# Patient Record
Sex: FEMALE
[De-identification: names, ages, dates, MRNs, and addresses within clinical notes are randomized per-mention and may not be internally consistent; named-entity substitution may affect disease eponyms.]

---

## 2019-02-15 ENCOUNTER — RECORD ABSTRACTING (OUTPATIENT)
Age: 24
End: 2019-02-15

## 2019-02-15 DIAGNOSIS — Z78.9 OTHER SPECIFIED HEALTH STATUS: ICD-10-CM

## 2019-02-15 PROBLEM — Z00.00 ENCOUNTER FOR PREVENTIVE HEALTH EXAMINATION: Status: ACTIVE | Noted: 2019-02-15

## 2019-03-04 ENCOUNTER — APPOINTMENT (OUTPATIENT)
Dept: INTERNAL MEDICINE | Facility: CLINIC | Age: 24
End: 2019-03-04
Payer: COMMERCIAL

## 2019-03-04 VITALS
TEMPERATURE: 99.1 F | SYSTOLIC BLOOD PRESSURE: 90 MMHG | WEIGHT: 145 LBS | OXYGEN SATURATION: 98 % | HEIGHT: 68 IN | HEART RATE: 65 BPM | RESPIRATION RATE: 14 BRPM | DIASTOLIC BLOOD PRESSURE: 76 MMHG | BODY MASS INDEX: 21.98 KG/M2

## 2019-03-04 DIAGNOSIS — K64.9 UNSPECIFIED HEMORRHOIDS: ICD-10-CM

## 2019-03-04 DIAGNOSIS — Z83.2 FAMILY HISTORY OF DISEASES OF THE BLOOD AND BLOOD-FORMING ORGANS AND CERTAIN DISORDERS INVOLVING THE IMMUNE MECHANISM: ICD-10-CM

## 2019-03-04 DIAGNOSIS — Z83.49 FAMILY HISTORY OF OTHER ENDOCRINE, NUTRITIONAL AND METABOLIC DISEASES: ICD-10-CM

## 2019-03-04 PROCEDURE — 99214 OFFICE O/P EST MOD 30 MIN: CPT

## 2019-03-04 RX ORDER — ESOMEPRAZOLE MAGNESIUM 40 MG/1
40 CAPSULE, DELAYED RELEASE ORAL DAILY
Qty: 30 | Refills: 0 | Status: DISCONTINUED | COMMUNITY
Start: 2019-03-04 | End: 2019-03-04

## 2019-03-04 NOTE — PAST MEDICAL HISTORY
[Menstruating] : menstruating [Definite ___ (Date)] : the last menstrual period was [unfilled] [Irregular Cycle Intervals] : are  irregular [Total Preg ___] : G[unfilled]

## 2019-03-04 NOTE — COUNSELING
[Healthy eating counseling provided] : healthy eating [Low Fat Diet] : Low fat diet [Decrease Portions] : Decrease food portions [Walking] : Walking

## 2019-03-04 NOTE — PLAN
[FreeTextEntry1] : Dermatology- onychomycosis resolved monitor for reinfection. Discontinue Lamisil.\par Dermatology ABELARDO was encouraged to wear sun protective clothing, sun block, and proper use of SPF board brim hats, to seek shade and to avoid the sun in peak hours.  ABCD of skin moles was advised.  \par \par Gastroenterology- GERD- advised patient bland diet. Trial of Nexium 40 mg one tab daily. Request colonoscopy report.\par \par Endocrinology- fatigue- recently saw her endocrinologist for her fatigue workup. Advised patient to call physician to find out what the lab results showed. Request a copy of recent labs.\par \par return to the office in 4 months for followup

## 2019-03-04 NOTE — HISTORY OF PRESENT ILLNESS
[FreeTextEntry1] : A 23-year-old female who presents to the office for a checkup and renewal of medication for onychomycosis [de-identified] : A 23-year-old female who presents to the office for a checkup and renewal of medication for onychomycosis.  Patient states she's been taken generic Lamisil for a couple months which seems to be working. Patient denies any side effects to the medication.\par Recently saw endocrinology for a fatigue workup. Had blood taken to rule out hypothyroidism. Pending results. Also show her gastroenterology had a colonoscopy which showed internal and external hemorrhoids. Was placed on Dexilant which helped with  the abdominal pain. States she never filled the medication secondary to the cost.

## 2019-03-04 NOTE — HEALTH RISK ASSESSMENT
[No falls in past year] : Patient reported no falls in the past year [] : No [de-identified] : rare [de-identified] : denies  [de-identified] : regular

## 2019-03-04 NOTE — PHYSICAL EXAM

## 2019-03-28 ENCOUNTER — RX CHANGE (OUTPATIENT)
Age: 24
End: 2019-03-28

## 2019-03-29 ENCOUNTER — RX RENEWAL (OUTPATIENT)
Age: 24
End: 2019-03-29

## 2019-04-04 ENCOUNTER — RX RENEWAL (OUTPATIENT)
Age: 24
End: 2019-04-04

## 2019-04-25 ENCOUNTER — RX CHANGE (OUTPATIENT)
Age: 24
End: 2019-04-25

## 2019-05-21 ENCOUNTER — APPOINTMENT (OUTPATIENT)
Dept: INTERNAL MEDICINE | Facility: CLINIC | Age: 24
End: 2019-05-21
Payer: COMMERCIAL

## 2019-05-21 VITALS
OXYGEN SATURATION: 99 % | WEIGHT: 141 LBS | TEMPERATURE: 98.4 F | DIASTOLIC BLOOD PRESSURE: 80 MMHG | HEART RATE: 81 BPM | HEIGHT: 68 IN | SYSTOLIC BLOOD PRESSURE: 110 MMHG | RESPIRATION RATE: 16 BRPM | BODY MASS INDEX: 21.37 KG/M2

## 2019-05-21 DIAGNOSIS — B35.1 TINEA UNGUIUM: ICD-10-CM

## 2019-05-21 DIAGNOSIS — B97.7 PAPILLOMAVIRUS AS THE CAUSE OF DISEASES CLASSIFIED ELSEWHERE: ICD-10-CM

## 2019-05-21 PROCEDURE — 99213 OFFICE O/P EST LOW 20 MIN: CPT

## 2019-05-21 RX ORDER — TERBINAFINE HYDROCHLORIDE 250 MG/1
250 TABLET ORAL DAILY
Refills: 0 | Status: DISCONTINUED | COMMUNITY
End: 2019-05-21

## 2019-05-21 NOTE — ASSESSMENT
[FreeTextEntry1] : A 24 year old female who present to the office for evaluation of nail fungus and left eye swelling

## 2019-05-21 NOTE — HEALTH RISK ASSESSMENT
[No falls in past year] : Patient reported no falls in the past year [1] : 1) Little interest or pleasure doing things for several days (1) [0] : 2) Feeling down, depressed, or hopeless: Not at all (0) [Patient reported PAP Smear was abnormal] : Patient reported PAP Smear was abnormal [Fully functional (bathing, dressing, toileting, transferring, walking, feeding)] : Fully functional (bathing, dressing, toileting, transferring, walking, feeding) [Fully functional (using the telephone, shopping, preparing meals, housekeeping, doing laundry, using] : Fully functional and needs no help or supervision to perform IADLs (using the telephone, shopping, preparing meals, housekeeping, doing laundry, using transportation, managing medications and managing finances) [] : No [de-identified] : rare [de-identified] : Currently not exercising  [de-identified] : Regular  [FreeTextEntry1] : Gets board her jobs.  [[id number]] : 1 [Change in mental status noted] : No change in mental status noted [PapSmearDate] : 01/19 [PapSmearComments] : HPV

## 2019-05-21 NOTE — HISTORY OF PRESENT ILLNESS
[FreeTextEntry1] : A 24 year-old female who presents to the office for a checkup and evaluation for  onychomycosis [de-identified] : A 24-year-old female who presents to the office for a checkup and for evaluation for onychomycosis.  Patient states she was recently treated for the infection with Lamisil.  States the medication work well and cleared the infection.  Denies any side effect to the medication while she was on it. Recently had her nails done and when she took off the tips it looks like the fungal infection is back.  \par Also woke up to day with left eye swelling.  Denies pain.  Saw opthalmology today and was given abx for the eye

## 2019-05-21 NOTE — PLAN
[FreeTextEntry1] : Dermatology- onychomycosis -  No reoccurrence of the infection noted   Start Penlac daily as directed.  \par Dermatology EMAJE was encouraged to wear sun protective clothing, sun block, and proper use of SPF board brim hats, to seek shade and to avoid the sun in peak hours.  ABCD of skin moles was advised.  \par \par Gastroenterology- GERD- advised patient bland diet. Continue with a PPI.  Advised to avoid ETOH. \par \par Opthalmology  -Sty (chalazion)  Advised to use abx oint as directed to.  Warm compresses 3-4 times a day.  Avoid itching the lesion.  If not improving followup with ophthalmologist \par \par return to the office in 4 months for followup\par \par Gyn advised to call Gyn to have a repeat PAP smear and pelvic exam

## 2019-05-21 NOTE — PHYSICAL EXAM

## 2019-05-21 NOTE — REVIEW OF SYSTEMS
[Negative] : Heme/Lymph [Discharge] : discharge [FreeTextEntry3] : irritated  [de-identified] : nail infection

## 2019-05-21 NOTE — COUNSELING
[Healthy eating counseling provided] : healthy eating [Needs reinforcement, provided] : Patient needs reinforcement on understanding of disease, goals and obesity follow-up plan; reinforcement was provided [Low Fat Diet] : Low fat diet [Decrease Portions] : Decrease food portions [Patient motivation] : Patient motivation [None] : None [Good understanding] : Patient has a good understanding of lifestyle changes and the steps needed to achieve self management goals [de-identified] : warm soaks to the left eye

## 2019-06-07 ENCOUNTER — APPOINTMENT (OUTPATIENT)
Dept: INTERNAL MEDICINE | Facility: CLINIC | Age: 24
End: 2019-06-07
Payer: COMMERCIAL

## 2019-06-07 VITALS
SYSTOLIC BLOOD PRESSURE: 118 MMHG | DIASTOLIC BLOOD PRESSURE: 80 MMHG | HEIGHT: 68 IN | HEART RATE: 88 BPM | RESPIRATION RATE: 16 BRPM | TEMPERATURE: 98.8 F | WEIGHT: 140 LBS | BODY MASS INDEX: 21.22 KG/M2 | OXYGEN SATURATION: 98 %

## 2019-06-07 LAB — S PYO AG SPEC QL IA: NEGATIVE

## 2019-06-07 PROCEDURE — 87880 STREP A ASSAY W/OPTIC: CPT | Mod: QW

## 2019-06-07 PROCEDURE — 99213 OFFICE O/P EST LOW 20 MIN: CPT | Mod: 25

## 2019-06-07 PROCEDURE — 36415 COLL VENOUS BLD VENIPUNCTURE: CPT

## 2019-06-07 NOTE — COUNSELING
[Healthy eating counseling provided] : healthy eating [Decrease Portions] : Decrease food portions [Low Fat Diet] : Low fat diet [Needs reinforcement, provided] : Patient needs reinforcement on understanding of disease, goals and obesity follow-up plan; reinforcement was provided [None] : None [Good understanding] : Patient has a good understanding of lifestyle changes and the steps needed to achieve self management goals [Patient motivation] : Patient motivation [de-identified] : warm soaks to the left eye

## 2019-06-07 NOTE — REVIEW OF SYSTEMS
[Discharge] : discharge [Negative] : Heme/Lymph [FreeTextEntry3] : irritated  [de-identified] : nail infection

## 2019-06-07 NOTE — PLAN
[FreeTextEntry1] : ENT Pharyngitis exudative tonsillitis  - Check rapid throat culture which was negative.  Advised to increase fluids, Tylenol or Motrin for  fever and pain.  Will check over night culture for confirmation.  Also can use throat lozenges as needed.   Check cbc, cmp,monospot.  Start zpack.  \par \par Dermatology- onychomycosis -  No reoccurrence of the infection noted   Start Penlac daily as directed.  \par Dermatology EMAJE was encouraged to wear sun protective clothing, sun block, and proper use of SPF board brim hats, to seek shade and to avoid the sun in peak hours.  ABCD of skin moles was advised.  \par \par Gastroenterology- GERD- advised patient bland diet. Continue with a PPI.  Advised to avoid ETOH. \par \par Opthalmology  -Sty (chalazion)  Resolved\par \par Gyn advised to call Gyn to have a repeat PAP smear and pelvic exam

## 2019-06-07 NOTE — HISTORY OF PRESENT ILLNESS
[___ Days ago] : [unfilled] days ago [Moderate] : moderate [Constant] : constant [Congestion] : congestion [Sore Throat] : sore throat [Fatigue] : fatigue [Worsening] : worsening [Cough] : no cough [FreeTextEntry5] : Unaware  [FreeTextEntry4] : Unaware  [FreeTextEntry8] : Patient states she's been taken steroid nasal spray without any relief. First it was allergy related until this morning when she looked in the back of her throat and noticed a lot of white spots on both tonsils\par \par Since last visit the stye has resolved and the fingernails look much better

## 2019-06-07 NOTE — PHYSICAL EXAM
[Well Nourished] : well nourished [No Acute Distress] : no acute distress [Well Developed] : well developed [Normal Sclera/Conjunctiva] : normal sclera/conjunctiva [Well-Appearing] : well-appearing [PERRL] : pupils equal round and reactive to light [Normal Outer Ear/Nose] : the outer ears and nose were normal in appearance [EOMI] : extraocular movements intact [Normal Oropharynx] : the oropharynx was normal [No JVD] : no jugular venous distention [Supple] : supple [Thyroid Normal, No Nodules] : the thyroid was normal and there were no nodules present [No Respiratory Distress] : no respiratory distress  [Clear to Auscultation] : lungs were clear to auscultation bilaterally [Regular Rhythm] : with a regular rhythm [Normal Rate] : normal rate  [No Accessory Muscle Use] : no accessory muscle use [No Murmur] : no murmur heard [Normal S1, S2] : normal S1 and S2 [No Abdominal Bruit] : a ~M bruit was not heard ~T in the abdomen [No Varicosities] : no varicosities [No Carotid Bruits] : no carotid bruits [Pedal Pulses Present] : the pedal pulses are present [No Edema] : there was no peripheral edema [No Palpable Aorta] : no palpable aorta [Soft] : abdomen soft [No Extremity Clubbing/Cyanosis] : no extremity clubbing/cyanosis [No Masses] : no abdominal mass palpated [Non Tender] : non-tender [Non-distended] : non-distended [No HSM] : no HSM [Normal Posterior Cervical Nodes] : no posterior cervical lymphadenopathy [Normal Bowel Sounds] : normal bowel sounds [No Spinal Tenderness] : no spinal tenderness [No CVA Tenderness] : no CVA  tenderness [No Joint Swelling] : no joint swelling [Grossly Normal Strength/Tone] : grossly normal strength/tone [Coordination Grossly Intact] : coordination grossly intact [No Rash] : no rash [No Focal Deficits] : no focal deficits [Normal Gait] : normal gait [Deep Tendon Reflexes (DTR)] : deep tendon reflexes were 2+ and symmetric [Normal Insight/Judgement] : insight and judgment were intact [Normal Affect] : the affect was normal [de-identified] : Exudative tonsillitis noted on both sides [de-identified] : Swollen tonsillar nodes and anterior cervical chain nodes [de-identified] : Anterior cervical chains nodes noted bilaterally [de-identified] : Fingernail no active signs of onychomycosis/tinea [FreeTextEntry1] : abdomen

## 2019-06-07 NOTE — HEALTH RISK ASSESSMENT
[No falls in past year] : Patient reported no falls in the past year [1] : 1) Little interest or pleasure doing things for several days (1) [0] : 2) Feeling down, depressed, or hopeless: Not at all (0) [Patient reported PAP Smear was abnormal] : Patient reported PAP Smear was abnormal [Fully functional (bathing, dressing, toileting, transferring, walking, feeding)] : Fully functional (bathing, dressing, toileting, transferring, walking, feeding) [Fully functional (using the telephone, shopping, preparing meals, housekeeping, doing laundry, using] : Fully functional and needs no help or supervision to perform IADLs (using the telephone, shopping, preparing meals, housekeeping, doing laundry, using transportation, managing medications and managing finances) [] : No [de-identified] : rare [de-identified] : Currently not exercising  [de-identified] : Regular  [FreeTextEntry1] : Gets board her jobs.  [[id number]] : 1 [Change in mental status noted] : No change in mental status noted [PapSmearDate] : 01/19 [PapSmearComments] : HPV

## 2019-06-10 LAB
ALBUMIN SERPL ELPH-MCNC: 4.6 G/DL
ALP BLD-CCNC: 65 U/L
ALT SERPL-CCNC: 33 U/L
ANION GAP SERPL CALC-SCNC: 16 MMOL/L
AST SERPL-CCNC: 30 U/L
BACTERIA THROAT CULT: NORMAL
BASOPHILS # BLD AUTO: 0.03 K/UL
BASOPHILS NFR BLD AUTO: 0.6 %
BILIRUB SERPL-MCNC: 0.3 MG/DL
BUN SERPL-MCNC: 10 MG/DL
CALCIUM SERPL-MCNC: 9.6 MG/DL
CHLORIDE SERPL-SCNC: 106 MMOL/L
CO2 SERPL-SCNC: 22 MMOL/L
CREAT SERPL-MCNC: 0.77 MG/DL
EOSINOPHIL # BLD AUTO: 0.08 K/UL
EOSINOPHIL NFR BLD AUTO: 1.5 %
GLUCOSE SERPL-MCNC: 96 MG/DL
HCT VFR BLD CALC: 37.2 %
HETEROPH AB SER QL: POSITIVE
HGB BLD-MCNC: 11.5 G/DL
IMM GRANULOCYTES NFR BLD AUTO: 0.2 %
LYMPHOCYTES # BLD AUTO: 2.47 K/UL
LYMPHOCYTES NFR BLD AUTO: 45.4 %
MAN DIFF?: NORMAL
MCHC RBC-ENTMCNC: 26 PG
MCHC RBC-ENTMCNC: 30.9 GM/DL
MCV RBC AUTO: 84.2 FL
MONOCYTES # BLD AUTO: 0.63 K/UL
MONOCYTES NFR BLD AUTO: 11.6 %
NEUTROPHILS # BLD AUTO: 2.22 K/UL
NEUTROPHILS NFR BLD AUTO: 40.7 %
PLATELET # BLD AUTO: 190 K/UL
POTASSIUM SERPL-SCNC: 3.9 MMOL/L
PROT SERPL-MCNC: 7.8 G/DL
RBC # BLD: 4.42 M/UL
RBC # FLD: 16.2 %
SODIUM SERPL-SCNC: 144 MMOL/L
WBC # FLD AUTO: 5.44 K/UL

## 2019-06-20 ENCOUNTER — APPOINTMENT (OUTPATIENT)
Dept: INTERNAL MEDICINE | Facility: CLINIC | Age: 24
End: 2019-06-20
Payer: COMMERCIAL

## 2019-06-20 VITALS
HEART RATE: 86 BPM | RESPIRATION RATE: 16 BRPM | WEIGHT: 138.13 LBS | TEMPERATURE: 98.1 F | OXYGEN SATURATION: 98 % | SYSTOLIC BLOOD PRESSURE: 100 MMHG | HEIGHT: 68 IN | DIASTOLIC BLOOD PRESSURE: 72 MMHG | BODY MASS INDEX: 20.93 KG/M2

## 2019-06-20 PROCEDURE — 99213 OFFICE O/P EST LOW 20 MIN: CPT

## 2019-06-20 RX ORDER — FLUCONAZOLE 100 MG/1
100 TABLET ORAL
Qty: 10 | Refills: 0 | Status: DISCONTINUED | COMMUNITY
Start: 2019-04-10

## 2019-06-20 RX ORDER — AMOXICILLIN 500 MG/1
500 CAPSULE ORAL
Qty: 21 | Refills: 0 | Status: DISCONTINUED | COMMUNITY
Start: 2019-05-07

## 2019-06-20 RX ORDER — TINIDAZOLE 500 MG/1
500 TABLET, FILM COATED ORAL
Qty: 10 | Refills: 0 | Status: DISCONTINUED | COMMUNITY
Start: 2019-04-19

## 2019-06-20 RX ORDER — AMOXICILLIN AND CLAVULANATE POTASSIUM 875; 125 MG/1; MG/1
875-125 TABLET, COATED ORAL
Qty: 20 | Refills: 0 | Status: DISCONTINUED | COMMUNITY
Start: 2019-01-10

## 2019-06-20 RX ORDER — SODIUM PICOSULFATE, MAGNESIUM OXIDE, AND ANHYDROUS CITRIC ACID 10; 3.5; 12 MG/160ML; G/160ML; G/160ML
10-3.5-12 MG-GM LIQUID ORAL
Qty: 320 | Refills: 0 | Status: DISCONTINUED | COMMUNITY
Start: 2018-12-28

## 2019-06-20 RX ORDER — FLUCONAZOLE 150 MG/1
150 TABLET ORAL
Qty: 2 | Refills: 0 | Status: DISCONTINUED | COMMUNITY
Start: 2019-01-10

## 2019-06-20 RX ORDER — CLOTRIMAZOLE AND BETAMETHASONE DIPROPIONATE 10; .5 MG/G; MG/G
1-0.05 CREAM TOPICAL
Qty: 15 | Refills: 0 | Status: DISCONTINUED | COMMUNITY
Start: 2019-04-10

## 2019-06-20 RX ORDER — NEOMYCIN AND POLYMYXIN B SULFATES AND DEXAMETHASONE 3.5; 10000; 1 MG/G; [IU]/G; MG/G
3.5-10000-0.1 OINTMENT OPHTHALMIC
Qty: 4 | Refills: 0 | Status: DISCONTINUED | COMMUNITY
Start: 2019-05-21

## 2019-06-20 RX ORDER — OXYCODONE AND ACETAMINOPHEN 5; 325 MG/1; MG/1
5-325 TABLET ORAL
Qty: 10 | Refills: 0 | Status: DISCONTINUED | COMMUNITY
Start: 2019-05-07

## 2019-06-20 NOTE — ASSESSMENT
[FreeTextEntry1] : A 24 year old female who present to the office for follow up after being dx with mono

## 2019-06-20 NOTE — COUNSELING
[Healthy eating counseling provided] : healthy eating [Low Fat Diet] : Low fat diet [Needs reinforcement, provided] : Patient needs reinforcement on understanding of disease, goals and obesity follow-up plan; reinforcement was provided [Decrease Portions] : Decrease food portions [Patient motivation] : Patient motivation [None] : None [Good understanding] : Patient has a good understanding of lifestyle changes and the steps needed to achieve self management goals [de-identified] : Avoid crowed rooms

## 2019-06-20 NOTE — PAST MEDICAL HISTORY
[Definite ___ (Date)] : the last menstrual period was [unfilled] [Menstruating] : menstruating [Irregular Cycle Intervals] : are  irregular [Total Preg ___] : G[unfilled]

## 2019-06-20 NOTE — HEALTH RISK ASSESSMENT
[] : No [No falls in past year] : Patient reported no falls in the past year [1] : 1) Little interest or pleasure doing things for several days (1) [0] : 2) Feeling down, depressed, or hopeless: Not at all (0) [de-identified] : rare [de-identified] : Currently not exercising  [de-identified] : Regular  [FreeTextEntry1] : Gets board her jobs.  [[id number]] : 1 [Patient reported PAP Smear was abnormal] : Patient reported PAP Smear was abnormal [Fully functional (bathing, dressing, toileting, transferring, walking, feeding)] : Fully functional (bathing, dressing, toileting, transferring, walking, feeding) [Change in mental status noted] : No change in mental status noted [Fully functional (using the telephone, shopping, preparing meals, housekeeping, doing laundry, using] : Fully functional and needs no help or supervision to perform IADLs (using the telephone, shopping, preparing meals, housekeeping, doing laundry, using transportation, managing medications and managing finances) [PapSmearComments] : HPV  [PapSmearDate] : 01/19

## 2019-06-20 NOTE — HISTORY OF PRESENT ILLNESS
[FreeTextEntry1] : Follow up on Sore throat and recent abnormal labs  [de-identified] : A 24-year-old female, with a past medical history as noted below, presents to the office for followup after being diagnosed with mononucleosis. Patient states she's feeling better sore throat has resolved. Still has swollen gland and small white spots on the left side of her throat. Patient denies being any more fatigue than her usual self. Patient denies any recent fever or chills

## 2019-06-20 NOTE — PLAN
[FreeTextEntry1] : ENT mononucleosis - patient fell and much better. Advised patient to continue to get rest. Avoid crowded rooms and do not share any drinks. Repeat labs in 4 weeks.   Advised if feeling ill or sick return to the office for reevaluation.  \par \par Dermatology- onychomycosis -  No reoccurrence of the infection noted   Start Penlac daily as directed.  \par Dermatology EMAJE was encouraged to wear sun protective clothing, sun block, and proper use of SPF board brim hats, to seek shade and to avoid the sun in peak hours.  ABCD of skin moles was advised.  \par \par Gastroenterology- GERD- advised patient bland diet. Continue with a PPI.  Advised to avoid ETOH. \par \par Gyn advised to call Gyn to have a repeat PAP smear and pelvic exam

## 2019-06-20 NOTE — PHYSICAL EXAM
[No Acute Distress] : no acute distress [Well Nourished] : well nourished [Well Developed] : well developed [Well-Appearing] : well-appearing [Normal Sclera/Conjunctiva] : normal sclera/conjunctiva [PERRL] : pupils equal round and reactive to light [Normal Outer Ear/Nose] : the outer ears and nose were normal in appearance [EOMI] : extraocular movements intact [Normal Oropharynx] : the oropharynx was normal [No JVD] : no jugular venous distention [Supple] : supple [Thyroid Normal, No Nodules] : the thyroid was normal and there were no nodules present [No Respiratory Distress] : no respiratory distress  [Clear to Auscultation] : lungs were clear to auscultation bilaterally [Normal Rate] : normal rate  [No Accessory Muscle Use] : no accessory muscle use [Normal S1, S2] : normal S1 and S2 [Regular Rhythm] : with a regular rhythm [No Carotid Bruits] : no carotid bruits [No Murmur] : no murmur heard [No Abdominal Bruit] : a ~M bruit was not heard ~T in the abdomen [No Varicosities] : no varicosities [Pedal Pulses Present] : the pedal pulses are present [No Edema] : there was no peripheral edema [No Extremity Clubbing/Cyanosis] : no extremity clubbing/cyanosis [No Palpable Aorta] : no palpable aorta [Non Tender] : non-tender [Soft] : abdomen soft [No Masses] : no abdominal mass palpated [Non-distended] : non-distended [No HSM] : no HSM [Normal Bowel Sounds] : normal bowel sounds [Normal Posterior Cervical Nodes] : no posterior cervical lymphadenopathy [No CVA Tenderness] : no CVA  tenderness [No Joint Swelling] : no joint swelling [No Spinal Tenderness] : no spinal tenderness [No Rash] : no rash [Grossly Normal Strength/Tone] : grossly normal strength/tone [Coordination Grossly Intact] : coordination grossly intact [Normal Gait] : normal gait [No Focal Deficits] : no focal deficits [Normal Affect] : the affect was normal [Deep Tendon Reflexes (DTR)] : deep tendon reflexes were 2+ and symmetric [de-identified] : Exudative tonsillitis muched improved  [Normal Insight/Judgement] : insight and judgment were intact [de-identified] : Swollen tonsillar nodes  left side much smaller non tender  [de-identified] : Left tonsilla node smaller  [de-identified] : spleen less than 4 fingers on palpation  [de-identified] : Fingernail no active signs of onychomycosis/tinea [FreeTextEntry1] : abdomen

## 2019-07-18 ENCOUNTER — APPOINTMENT (OUTPATIENT)
Dept: INTERNAL MEDICINE | Facility: CLINIC | Age: 24
End: 2019-07-18
Payer: COMMERCIAL

## 2019-07-18 VITALS
TEMPERATURE: 98.7 F | WEIGHT: 144.31 LBS | BODY MASS INDEX: 21.87 KG/M2 | DIASTOLIC BLOOD PRESSURE: 72 MMHG | SYSTOLIC BLOOD PRESSURE: 116 MMHG | RESPIRATION RATE: 16 BRPM | HEART RATE: 82 BPM | HEIGHT: 68 IN

## 2019-07-18 DIAGNOSIS — Z86.19 PERSONAL HISTORY OF OTHER INFECTIOUS AND PARASITIC DISEASES: ICD-10-CM

## 2019-07-18 DIAGNOSIS — F41.9 ANXIETY DISORDER, UNSPECIFIED: ICD-10-CM

## 2019-07-18 DIAGNOSIS — F32.9 ANXIETY DISORDER, UNSPECIFIED: ICD-10-CM

## 2019-07-18 DIAGNOSIS — H00.15 CHALAZION LEFT LOWER EYELID: ICD-10-CM

## 2019-07-18 LAB — S PYO AG SPEC QL IA: NEGATIVE

## 2019-07-18 PROCEDURE — 87880 STREP A ASSAY W/OPTIC: CPT | Mod: QW

## 2019-07-18 PROCEDURE — 99214 OFFICE O/P EST MOD 30 MIN: CPT | Mod: 25

## 2019-07-18 PROCEDURE — 36415 COLL VENOUS BLD VENIPUNCTURE: CPT

## 2019-07-18 NOTE — PAST MEDICAL HISTORY
[Menstruating] : menstruating [Irregular Cycle Intervals] : are  irregular [Total Preg ___] : G[unfilled] [Definite ___ (Date)] : the last menstrual period was [unfilled]

## 2019-07-23 NOTE — REVIEW OF SYSTEMS
[Swollen Glands] : swollen glands [Negative] : Neurological [Fatigue] : fatigue [Anxiety] : anxiety [Depression] : depression [Discharge] : no discharge [FreeTextEntry4] : exudates

## 2019-07-23 NOTE — HISTORY OF PRESENT ILLNESS
[FreeTextEntry1] : Follow up on Mono  [de-identified] : A 24-year-old female, with a past medical history as noted below, presents to the office for followup after being diagnosed with mononucleosis. Patient states she's feeling better, but still has white patches in the back of throat and suffers from fatigue.\par Also patient states that she still having anxiety/depression issues. Most of the time doesn't want to leave her house and she does she wants to return immediately.  Patient denies taking medication for anxiety/depression in the past. Patient denies suicidal or homicidal ideation

## 2019-07-23 NOTE — PHYSICAL EXAM
[No Acute Distress] : no acute distress [Well Nourished] : well nourished [Well Developed] : well developed [Well-Appearing] : well-appearing [Normal Sclera/Conjunctiva] : normal sclera/conjunctiva [PERRL] : pupils equal round and reactive to light [EOMI] : extraocular movements intact [Normal Outer Ear/Nose] : the outer ears and nose were normal in appearance [Normal Oropharynx] : the oropharynx was normal [No JVD] : no jugular venous distention [Supple] : supple [Thyroid Normal, No Nodules] : the thyroid was normal and there were no nodules present [No Respiratory Distress] : no respiratory distress  [Clear to Auscultation] : lungs were clear to auscultation bilaterally [No Accessory Muscle Use] : no accessory muscle use [Normal Rate] : normal rate  [Regular Rhythm] : with a regular rhythm [Normal S1, S2] : normal S1 and S2 [No Murmur] : no murmur heard [No Carotid Bruits] : no carotid bruits [No Abdominal Bruit] : a ~M bruit was not heard ~T in the abdomen [No Varicosities] : no varicosities [Pedal Pulses Present] : the pedal pulses are present [No Edema] : there was no peripheral edema [No Extremity Clubbing/Cyanosis] : no extremity clubbing/cyanosis [No Palpable Aorta] : no palpable aorta [Soft] : abdomen soft [Non Tender] : non-tender [Non-distended] : non-distended [No Masses] : no abdominal mass palpated [No HSM] : no HSM [Normal Bowel Sounds] : normal bowel sounds [Normal Posterior Cervical Nodes] : no posterior cervical lymphadenopathy [No CVA Tenderness] : no CVA  tenderness [No Spinal Tenderness] : no spinal tenderness [No Joint Swelling] : no joint swelling [Grossly Normal Strength/Tone] : grossly normal strength/tone [No Rash] : no rash [Normal Gait] : normal gait [Coordination Grossly Intact] : coordination grossly intact [No Focal Deficits] : no focal deficits [Deep Tendon Reflexes (DTR)] : deep tendon reflexes were 2+ and symmetric [Normal Affect] : the affect was normal [Normal Insight/Judgement] : insight and judgment were intact [Speech Grossly Normal] : speech grossly normal [Alert and Oriented x3] : oriented to person, place, and time [Normal Mood] : the mood was normal [de-identified] : Exudative tonsillitis has  improved  [de-identified] : Swollen tonsillar nodes  left side much smaller non tender  [de-identified] : spleen less than 4 fingers on palpation  [de-identified] : Left tonsilla node smaller  [de-identified] : Fingernail no active signs of onychomycosis/tinea [FreeTextEntry1] : abdomen

## 2019-07-23 NOTE — PLAN
[FreeTextEntry1] : ENT mononucleosis - patient fell and much better. Advised patient to continue to get rest. Avoid crowded rooms and do not share any drinks. check labs, repeat throat culture was negative.   Advised if feeling ill or sick return to the office for reevaluation.  \par \par Dermatology- onychomycosis -  No reoccurrence of the infection noted   Continue w  Penlac daily as directed.  \par \par Gastroenterology- GERD- advised patient bland diet. Continue with a PPI.  Advised to avoid ETOH. \par \par Psych -Anxiety/depression  -  Counseling given to the pt.  Discussed medication benefits. Also discussed psychotherapy.  Check labs.  Start lexapro 10 mg daily. discussed side effects to the medication. return to the office 2 weeks for a follow up

## 2019-07-23 NOTE — HEALTH RISK ASSESSMENT
[Yes] : Yes [Monthly or less (1 pt)] : Monthly or less (1 point) [1 or 2 (0 pts)] : 1 or 2 (0 points) [Never (0 pts)] : Never (0 points) [No] : In the past 12 months have you used drugs other than those required for medical reasons? No [No falls in past year] : Patient reported no falls in the past year [1] : 2) Feeling down, depressed, or hopeless for several days (1) [Patient reported PAP Smear was abnormal] : Patient reported PAP Smear was abnormal [Fully functional (bathing, dressing, toileting, transferring, walking, feeding)] : Fully functional (bathing, dressing, toileting, transferring, walking, feeding) [Fully functional (using the telephone, shopping, preparing meals, housekeeping, doing laundry, using] : Fully functional and needs no help or supervision to perform IADLs (using the telephone, shopping, preparing meals, housekeeping, doing laundry, using transportation, managing medications and managing finances) [] : No [Audit-CScore] : 1 [de-identified] : rare [de-identified] : Regular  [de-identified] : Currently not exercising  [FreeTextEntry1] : Gets board at her jobs.  [[id number]] : 2 [Change in mental status noted] : No change in mental status noted [PapSmearDate] : 01/19 [PapSmearComments] : HPV

## 2019-07-23 NOTE — COUNSELING
[Healthy eating counseling provided] : healthy eating [Needs reinforcement, provided] : Patient needs reinforcement on understanding of disease, goals and obesity follow-up plan; reinforcement was provided [Low Fat Diet] : Low fat diet [Decrease Portions] : Decrease food portions [Patient motivation] : Patient motivation [None] : None [de-identified] : Avoid crowed rooms

## 2019-08-01 LAB
ALBUMIN SERPL ELPH-MCNC: 4.6 G/DL
ALP BLD-CCNC: 53 U/L
ALT SERPL-CCNC: 14 U/L
ANION GAP SERPL CALC-SCNC: 13 MMOL/L
AST SERPL-CCNC: 13 U/L
BACTERIA THROAT CULT: NORMAL
BASOPHILS # BLD AUTO: 0.01 K/UL
BASOPHILS NFR BLD AUTO: 0.2 %
BILIRUB SERPL-MCNC: 0.3 MG/DL
BUN SERPL-MCNC: 6 MG/DL
CALCIUM SERPL-MCNC: 9.2 MG/DL
CHLORIDE SERPL-SCNC: 105 MMOL/L
CO2 SERPL-SCNC: 25 MMOL/L
CREAT SERPL-MCNC: 0.75 MG/DL
EOSINOPHIL # BLD AUTO: 0.19 K/UL
EOSINOPHIL NFR BLD AUTO: 3.4 %
GLUCOSE SERPL-MCNC: 90 MG/DL
HCT VFR BLD CALC: 38.3 %
HGB BLD-MCNC: 12 G/DL
IMM GRANULOCYTES NFR BLD AUTO: 0.2 %
LYMPHOCYTES # BLD AUTO: 1.91 K/UL
LYMPHOCYTES NFR BLD AUTO: 34.5 %
MAN DIFF?: NORMAL
MCHC RBC-ENTMCNC: 26.7 PG
MCHC RBC-ENTMCNC: 31.3 GM/DL
MCV RBC AUTO: 85.3 FL
MONOCYTES # BLD AUTO: 0.44 K/UL
MONOCYTES NFR BLD AUTO: 7.9 %
NEUTROPHILS # BLD AUTO: 2.98 K/UL
NEUTROPHILS NFR BLD AUTO: 53.8 %
PLATELET # BLD AUTO: 161 K/UL
POTASSIUM SERPL-SCNC: 3.7 MMOL/L
PROT SERPL-MCNC: 7.6 G/DL
RBC # BLD: 4.49 M/UL
RBC # FLD: 17.2 %
SODIUM SERPL-SCNC: 143 MMOL/L
TSH SERPL-ACNC: 1.81 UIU/ML
VIT B12 SERPL-MCNC: 449 PG/ML
WBC # FLD AUTO: 5.54 K/UL

## 2019-08-14 ENCOUNTER — RX CHANGE (OUTPATIENT)
Age: 24
End: 2019-08-14

## 2019-08-15 ENCOUNTER — RX CHANGE (OUTPATIENT)
Age: 24
End: 2019-08-15

## 2019-09-13 ENCOUNTER — RESULT CHARGE (OUTPATIENT)
Age: 24
End: 2019-09-13

## 2019-09-13 ENCOUNTER — APPOINTMENT (OUTPATIENT)
Dept: INTERNAL MEDICINE | Facility: CLINIC | Age: 24
End: 2019-09-13

## 2019-09-13 ENCOUNTER — RX RENEWAL (OUTPATIENT)
Age: 24
End: 2019-09-13

## 2019-12-16 ENCOUNTER — APPOINTMENT (OUTPATIENT)
Dept: INTERNAL MEDICINE | Facility: CLINIC | Age: 24
End: 2019-12-16
Payer: COMMERCIAL

## 2019-12-16 VITALS
WEIGHT: 142 LBS | SYSTOLIC BLOOD PRESSURE: 110 MMHG | HEIGHT: 68 IN | OXYGEN SATURATION: 99 % | DIASTOLIC BLOOD PRESSURE: 72 MMHG | BODY MASS INDEX: 21.52 KG/M2 | TEMPERATURE: 99.2 F | RESPIRATION RATE: 16 BRPM | HEART RATE: 90 BPM

## 2019-12-16 DIAGNOSIS — K21.9 GASTRO-ESOPHAGEAL REFLUX DISEASE W/OUT ESOPHAGITIS: ICD-10-CM

## 2019-12-16 DIAGNOSIS — Z87.09 PERSONAL HISTORY OF OTHER DISEASES OF THE RESPIRATORY SYSTEM: ICD-10-CM

## 2019-12-16 DIAGNOSIS — J03.90 ACUTE TONSILLITIS, UNSPECIFIED: ICD-10-CM

## 2019-12-16 DIAGNOSIS — R05 COUGH: ICD-10-CM

## 2019-12-16 LAB — S PYO AG SPEC QL IA: NORMAL

## 2019-12-16 PROCEDURE — 87880 STREP A ASSAY W/OPTIC: CPT | Mod: QW

## 2019-12-16 PROCEDURE — 99213 OFFICE O/P EST LOW 20 MIN: CPT | Mod: 25

## 2019-12-16 RX ORDER — DULOXETINE HYDROCHLORIDE 60 MG/1
60 CAPSULE, DELAYED RELEASE PELLETS ORAL
Qty: 30 | Refills: 0 | Status: ACTIVE | COMMUNITY
Start: 2019-11-21

## 2019-12-16 RX ORDER — AZITHROMYCIN 250 MG/1
250 TABLET, FILM COATED ORAL
Qty: 1 | Refills: 1 | Status: ACTIVE | COMMUNITY
Start: 2019-12-16 | End: 1900-01-01

## 2019-12-16 RX ORDER — OMEPRAZOLE 20 MG/1
20 CAPSULE, DELAYED RELEASE ORAL
Refills: 0 | Status: ACTIVE | COMMUNITY

## 2019-12-16 NOTE — PLAN
[FreeTextEntry1] : ENT Pharyngitis - Check rapid throat culture which was negative.  Advised to increase fluids, Tylenol or Motrin for  fever and pain.  Will check over night culture for confirmation.  Also can use throat lozenges as needed. \par Call office if symptoms are getting worse.  \par \par Cough start Mucinex DM \par \par Gastroenterology- GERD- advised patient bland diet. Continue with a PPI.  Advised to avoid ETOH. \par \par Psych -Anxiety/depression  -  Counseling given to the pt.  Discussed medication benefits. Also discussed psychotherapy.  Currently  on Cymbalta 60 mg daily.  Follow up with psych \par \par

## 2019-12-16 NOTE — HEALTH RISK ASSESSMENT
[Yes] : Yes [1 or 2 (0 pts)] : 1 or 2 (0 points) [Never (0 pts)] : Never (0 points) [Monthly or less (1 pt)] : Monthly or less (1 point) [No] : In the past 12 months have you used drugs other than those required for medical reasons? No [No falls in past year] : Patient reported no falls in the past year [1] : 2) Feeling down, depressed, or hopeless for several days (1) [Fully functional (bathing, dressing, toileting, transferring, walking, feeding)] : Fully functional (bathing, dressing, toileting, transferring, walking, feeding) [Patient reported PAP Smear was abnormal] : Patient reported PAP Smear was abnormal [Fully functional (using the telephone, shopping, preparing meals, housekeeping, doing laundry, using] : Fully functional and needs no help or supervision to perform IADLs (using the telephone, shopping, preparing meals, housekeeping, doing laundry, using transportation, managing medications and managing finances) [] : No [Audit-CScore] : 1 [de-identified] : rare [de-identified] : Regular  [FreeTextEntry1] : Gets board at her jobs.  [de-identified] : Currently not exercising  [Change in mental status noted] : No change in mental status noted [[id number]] : 2 [PapSmearDate] : 01/19 [PapSmearComments] : HPV

## 2019-12-16 NOTE — ASSESSMENT
[FreeTextEntry1] : A 24 year old female who present to the office for acute sore throat and a cough

## 2019-12-16 NOTE — PHYSICAL EXAM
[No Acute Distress] : no acute distress [Well Nourished] : well nourished [Well-Appearing] : well-appearing [Well Developed] : well developed [PERRL] : pupils equal round and reactive to light [Normal Sclera/Conjunctiva] : normal sclera/conjunctiva [EOMI] : extraocular movements intact [Normal Outer Ear/Nose] : the outer ears and nose were normal in appearance [Normal Oropharynx] : the oropharynx was normal [No JVD] : no jugular venous distention [Supple] : supple [No Respiratory Distress] : no respiratory distress  [Clear to Auscultation] : lungs were clear to auscultation bilaterally [Thyroid Normal, No Nodules] : the thyroid was normal and there were no nodules present [Regular Rhythm] : with a regular rhythm [Normal Rate] : normal rate  [No Accessory Muscle Use] : no accessory muscle use [No Carotid Bruits] : no carotid bruits [Normal S1, S2] : normal S1 and S2 [No Varicosities] : no varicosities [No Abdominal Bruit] : a ~M bruit was not heard ~T in the abdomen [Pedal Pulses Present] : the pedal pulses are present [No Edema] : there was no peripheral edema [No Extremity Clubbing/Cyanosis] : no extremity clubbing/cyanosis [No Palpable Aorta] : no palpable aorta [Non Tender] : non-tender [Soft] : abdomen soft [No Masses] : no abdominal mass palpated [No HSM] : no HSM [Non-distended] : non-distended [Normal Posterior Cervical Nodes] : no posterior cervical lymphadenopathy [No CVA Tenderness] : no CVA  tenderness [Normal Bowel Sounds] : normal bowel sounds [No Spinal Tenderness] : no spinal tenderness [No Rash] : no rash [Grossly Normal Strength/Tone] : grossly normal strength/tone [No Joint Swelling] : no joint swelling [Coordination Grossly Intact] : coordination grossly intact [Normal Gait] : normal gait [No Focal Deficits] : no focal deficits [Normal Affect] : the affect was normal [Speech Grossly Normal] : speech grossly normal [Deep Tendon Reflexes (DTR)] : deep tendon reflexes were 2+ and symmetric [Alert and Oriented x3] : oriented to person, place, and time [Normal Mood] : the mood was normal [Normal Insight/Judgement] : insight and judgment were intact [de-identified] : post. pharynax mild erythema with a post nasal drip  [de-identified] : Left tonsilla node smaller  [de-identified] : spleen less than 4 fingers on palpation  [de-identified] : clear nasal discharge  [de-identified] : Fingernail no active signs of onychomycosis/tinea [FreeTextEntry1] : abdomen

## 2019-12-16 NOTE — REVIEW OF SYSTEMS
[Fatigue] : fatigue [Nasal Discharge] : nasal discharge [Sore Throat] : sore throat [Postnasal Drip] : postnasal drip [Cough] : cough [Negative] : Heme/Lymph [Discharge] : no discharge [Depression] : no depression [Anxiety] : no anxiety [Swollen Glands] : no swollen glands

## 2019-12-16 NOTE — HISTORY OF PRESENT ILLNESS
[Cold Symptoms] : cold symptoms [Moderate] : moderate [___ Days ago] : [unfilled] days ago [Congestion] : congestion [Constant] : constant [Cough] : cough [Sore Throat] : sore throat [Stable] : stable [OTC Remedies] : OTC remedies [Chills] : no chills [Earache] : no earache [Anorexia] : no anorexia [Shortness Of Breath] : no shortness of breath [Headache] : no headache [Fatigue] : not fatigue [Fever] : no fever [FreeTextEntry2] : productive cough with yellow phlegm  [FreeTextEntry4] : All day  [FreeTextEntry8] : States her symptoms started in [place] when she was at [employer].  Denies pregnacy\par \par Still taking Omeprazole and Cymbalta 60 mg daily

## 2019-12-16 NOTE — COUNSELING
[Low Fat Diet] : Low fat diet [Needs reinforcement, provided] : Patient needs reinforcement on understanding of disease, goals and obesity follow-up plan; reinforcement was provided [Healthy eating counseling provided] : healthy eating [Patient motivation] : Patient motivation [None] : None [Decrease Portions] : Decrease food portions [de-identified] : Avoid crowed rooms

## 2019-12-19 LAB — BACTERIA THROAT CULT: NORMAL

## 2020-07-22 ENCOUNTER — TRANSCRIPTION ENCOUNTER (OUTPATIENT)
Age: 25
End: 2020-07-22

## 2020-12-21 PROBLEM — Z87.09 HISTORY OF PHARYNGITIS: Status: RESOLVED | Noted: 2019-12-16 | Resolved: 2020-12-21

## 2022-04-24 ENCOUNTER — TRANSCRIPTION ENCOUNTER (OUTPATIENT)
Age: 27
End: 2022-04-24

## 2023-11-14 ENCOUNTER — NON-APPOINTMENT (OUTPATIENT)
Age: 28
End: 2023-11-14

## 2024-08-29 ENCOUNTER — NON-APPOINTMENT (OUTPATIENT)
Age: 29
End: 2024-08-29

## 2024-09-27 ENCOUNTER — NON-APPOINTMENT (OUTPATIENT)
Age: 29
End: 2024-09-27

## 2024-12-25 PROBLEM — F10.90 ALCOHOL USE: Status: ACTIVE | Noted: 2019-02-15

## 2025-02-26 ENCOUNTER — NON-APPOINTMENT (OUTPATIENT)
Age: 30
End: 2025-02-26

## 2025-09-11 ENCOUNTER — NON-APPOINTMENT (OUTPATIENT)
Age: 30
End: 2025-09-11